# Patient Record
Sex: FEMALE | Race: ASIAN | NOT HISPANIC OR LATINO
[De-identification: names, ages, dates, MRNs, and addresses within clinical notes are randomized per-mention and may not be internally consistent; named-entity substitution may affect disease eponyms.]

---

## 2017-04-10 ENCOUNTER — APPOINTMENT (OUTPATIENT)
Dept: INTERNAL MEDICINE | Facility: CLINIC | Age: 57
End: 2017-04-10

## 2017-04-10 VITALS
BODY MASS INDEX: 22.32 KG/M2 | WEIGHT: 126 LBS | SYSTOLIC BLOOD PRESSURE: 120 MMHG | TEMPERATURE: 97.9 F | DIASTOLIC BLOOD PRESSURE: 70 MMHG | HEIGHT: 63 IN

## 2017-04-10 RX ORDER — NAPROXEN 500 MG/1
500 TABLET ORAL
Qty: 40 | Refills: 0 | Status: ACTIVE | COMMUNITY
Start: 2017-04-10 | End: 1900-01-01

## 2017-05-31 ENCOUNTER — OUTPATIENT (OUTPATIENT)
Dept: OUTPATIENT SERVICES | Facility: HOSPITAL | Age: 57
LOS: 1 days | End: 2017-05-31
Payer: COMMERCIAL

## 2017-05-31 ENCOUNTER — APPOINTMENT (OUTPATIENT)
Dept: MAMMOGRAPHY | Facility: IMAGING CENTER | Age: 57
End: 2017-05-31

## 2017-05-31 DIAGNOSIS — Z00.8 ENCOUNTER FOR OTHER GENERAL EXAMINATION: ICD-10-CM

## 2017-05-31 PROCEDURE — 77067 SCR MAMMO BI INCL CAD: CPT

## 2017-05-31 PROCEDURE — 77063 BREAST TOMOSYNTHESIS BI: CPT

## 2017-06-27 ENCOUNTER — LABORATORY RESULT (OUTPATIENT)
Age: 57
End: 2017-06-27

## 2017-06-27 ENCOUNTER — APPOINTMENT (OUTPATIENT)
Dept: INTERNAL MEDICINE | Facility: CLINIC | Age: 57
End: 2017-06-27

## 2017-06-27 VITALS
BODY MASS INDEX: 22.32 KG/M2 | TEMPERATURE: 98 F | SYSTOLIC BLOOD PRESSURE: 108 MMHG | HEIGHT: 63 IN | WEIGHT: 126 LBS | DIASTOLIC BLOOD PRESSURE: 74 MMHG

## 2017-06-28 LAB
ALBUMIN SERPL ELPH-MCNC: 4.6 G/DL
ALP BLD-CCNC: 55 U/L
ALT SERPL-CCNC: 21 U/L
ANION GAP SERPL CALC-SCNC: 15 MMOL/L
AST SERPL-CCNC: 24 U/L
BASOPHILS # BLD AUTO: 0.01 K/UL
BASOPHILS NFR BLD AUTO: 0.2 %
BILIRUB SERPL-MCNC: 0.6 MG/DL
BUN SERPL-MCNC: 12 MG/DL
CALCIUM SERPL-MCNC: 9.7 MG/DL
CHLORIDE SERPL-SCNC: 104 MMOL/L
CHOLEST SERPL-MCNC: 195 MG/DL
CHOLEST/HDLC SERPL: 4 RATIO
CO2 SERPL-SCNC: 25 MMOL/L
CREAT SERPL-MCNC: 0.72 MG/DL
EOSINOPHIL # BLD AUTO: 0.08 K/UL
EOSINOPHIL NFR BLD AUTO: 1.9 %
GLUCOSE SERPL-MCNC: 92 MG/DL
HCT VFR BLD CALC: 39.8 %
HDLC SERPL-MCNC: 49 MG/DL
HGB BLD-MCNC: 13.3 G/DL
IMM GRANULOCYTES NFR BLD AUTO: 0.2 %
LDLC SERPL CALC-MCNC: 109 MG/DL
LYMPHOCYTES # BLD AUTO: 2.08 K/UL
LYMPHOCYTES NFR BLD AUTO: 48.9 %
MAN DIFF?: NORMAL
MCHC RBC-ENTMCNC: 30.4 PG
MCHC RBC-ENTMCNC: 33.4 GM/DL
MCV RBC AUTO: 91.1 FL
MONOCYTES # BLD AUTO: 0.36 K/UL
MONOCYTES NFR BLD AUTO: 8.5 %
NEUTROPHILS # BLD AUTO: 1.71 K/UL
NEUTROPHILS NFR BLD AUTO: 40.3 %
PLATELET # BLD AUTO: 202 K/UL
POTASSIUM SERPL-SCNC: 3.9 MMOL/L
PROT SERPL-MCNC: 7.4 G/DL
RBC # BLD: 4.37 M/UL
RBC # FLD: 12.4 %
SODIUM SERPL-SCNC: 144 MMOL/L
TRIGL SERPL-MCNC: 187 MG/DL
TSH SERPL-ACNC: 1.75 UIU/ML
WBC # FLD AUTO: 4.25 K/UL

## 2017-07-07 ENCOUNTER — RESULT REVIEW (OUTPATIENT)
Age: 57
End: 2017-07-07

## 2017-07-19 ENCOUNTER — APPOINTMENT (OUTPATIENT)
Dept: INTERNAL MEDICINE | Facility: CLINIC | Age: 57
End: 2017-07-19

## 2017-07-19 ENCOUNTER — LABORATORY RESULT (OUTPATIENT)
Age: 57
End: 2017-07-19

## 2017-07-25 ENCOUNTER — APPOINTMENT (OUTPATIENT)
Dept: ULTRASOUND IMAGING | Facility: CLINIC | Age: 57
End: 2017-07-25

## 2017-07-25 ENCOUNTER — OUTPATIENT (OUTPATIENT)
Dept: OUTPATIENT SERVICES | Facility: HOSPITAL | Age: 57
LOS: 1 days | End: 2017-07-25
Payer: COMMERCIAL

## 2017-07-25 DIAGNOSIS — E04.1 NONTOXIC SINGLE THYROID NODULE: ICD-10-CM

## 2017-07-25 PROCEDURE — 76536 US EXAM OF HEAD AND NECK: CPT

## 2017-08-31 ENCOUNTER — APPOINTMENT (OUTPATIENT)
Dept: INTERNAL MEDICINE | Facility: CLINIC | Age: 57
End: 2017-08-31
Payer: COMMERCIAL

## 2017-08-31 VITALS
WEIGHT: 127 LBS | SYSTOLIC BLOOD PRESSURE: 114 MMHG | BODY MASS INDEX: 22.5 KG/M2 | HEIGHT: 63 IN | DIASTOLIC BLOOD PRESSURE: 74 MMHG

## 2017-08-31 DIAGNOSIS — Z00.00 ENCOUNTER FOR GENERAL ADULT MEDICAL EXAMINATION W/OUT ABNORMAL FINDINGS: ICD-10-CM

## 2017-08-31 DIAGNOSIS — E78.5 HYPERLIPIDEMIA, UNSPECIFIED: ICD-10-CM

## 2017-08-31 PROCEDURE — 99396 PREV VISIT EST AGE 40-64: CPT | Mod: 25

## 2017-08-31 PROCEDURE — G0008: CPT

## 2017-08-31 PROCEDURE — 93000 ELECTROCARDIOGRAM COMPLETE: CPT

## 2017-08-31 PROCEDURE — 94010 BREATHING CAPACITY TEST: CPT

## 2017-08-31 PROCEDURE — 90686 IIV4 VACC NO PRSV 0.5 ML IM: CPT

## 2018-04-26 ENCOUNTER — OUTPATIENT (OUTPATIENT)
Dept: OUTPATIENT SERVICES | Facility: HOSPITAL | Age: 58
LOS: 1 days | End: 2018-04-26
Payer: COMMERCIAL

## 2018-04-26 ENCOUNTER — APPOINTMENT (OUTPATIENT)
Dept: RADIOLOGY | Facility: IMAGING CENTER | Age: 58
End: 2018-04-26
Payer: COMMERCIAL

## 2018-04-26 DIAGNOSIS — Z00.00 ENCOUNTER FOR GENERAL ADULT MEDICAL EXAMINATION WITHOUT ABNORMAL FINDINGS: ICD-10-CM

## 2018-04-26 PROCEDURE — 77080 DXA BONE DENSITY AXIAL: CPT

## 2018-04-26 PROCEDURE — 77080 DXA BONE DENSITY AXIAL: CPT | Mod: 26

## 2019-06-22 ENCOUNTER — EMERGENCY (EMERGENCY)
Facility: HOSPITAL | Age: 59
LOS: 1 days | Discharge: HOME | End: 2019-06-22
Admitting: EMERGENCY MEDICINE
Payer: COMMERCIAL

## 2019-06-22 VITALS
TEMPERATURE: 98 F | OXYGEN SATURATION: 99 % | RESPIRATION RATE: 16 BRPM | HEART RATE: 64 BPM | DIASTOLIC BLOOD PRESSURE: 82 MMHG | SYSTOLIC BLOOD PRESSURE: 137 MMHG

## 2019-06-22 PROCEDURE — 99283 EMERGENCY DEPT VISIT LOW MDM: CPT

## 2019-06-22 RX ORDER — CYCLOBENZAPRINE HYDROCHLORIDE 10 MG/1
5 TABLET, FILM COATED ORAL ONCE
Refills: 0 | Status: COMPLETED | OUTPATIENT
Start: 2019-06-22 | End: 2019-06-22

## 2019-06-22 RX ORDER — IBUPROFEN 200 MG
1 TABLET ORAL
Qty: 12 | Refills: 0
Start: 2019-06-22 | End: 2019-06-25

## 2019-06-22 RX ORDER — CYCLOBENZAPRINE HYDROCHLORIDE 10 MG/1
1 TABLET, FILM COATED ORAL
Qty: 9 | Refills: 0
Start: 2019-06-22 | End: 2019-06-24

## 2019-06-22 RX ORDER — ACETAMINOPHEN 500 MG
650 TABLET ORAL ONCE
Refills: 0 | Status: COMPLETED | OUTPATIENT
Start: 2019-06-22 | End: 2019-06-22

## 2019-06-22 RX ORDER — IBUPROFEN 200 MG
1 TABLET ORAL
Qty: 9 | Refills: 0
Start: 2019-06-22 | End: 2019-06-24

## 2019-06-22 RX ORDER — LIDOCAINE 4 G/100G
1 CREAM TOPICAL ONCE
Refills: 0 | Status: COMPLETED | OUTPATIENT
Start: 2019-06-22 | End: 2019-06-22

## 2019-06-22 RX ORDER — IBUPROFEN 200 MG
600 TABLET ORAL ONCE
Refills: 0 | Status: COMPLETED | OUTPATIENT
Start: 2019-06-22 | End: 2019-06-22

## 2019-06-22 RX ADMIN — CYCLOBENZAPRINE HYDROCHLORIDE 5 MILLIGRAM(S): 10 TABLET, FILM COATED ORAL at 12:59

## 2019-06-22 RX ADMIN — LIDOCAINE 1 PATCH: 4 CREAM TOPICAL at 12:59

## 2019-06-22 RX ADMIN — Medication 650 MILLIGRAM(S): at 12:58

## 2019-06-22 RX ADMIN — Medication 600 MILLIGRAM(S): at 12:59
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
## 2019-06-22 NOTE — ED PROVIDER NOTE - NSFOLLOWUPINSTRUCTIONS_ED_ALL_ED_FT
Follow with your PMD within 48-72 hours.  Rest, no heavy lifting.  Warm compresses to area. Recommend Ortho consult to discuss possible MRI vs Physical Therapy- referral list provided.  Light walking. Take Motrin 600 mg every 6-8 hours for pain with food, Flexeril 5mg every 8 hours as needed for muscle spasm- caution drowsiness/do not drive. Any worsening pain, weakness, numbness, bowel or urinary incontinence or new concerning symptoms return to the Emergency Department.
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
## 2019-06-22 NOTE — ED PROVIDER NOTE - PROGRESS NOTE DETAILS
STEFANIE Munoz- pt feeling much better with lower back and trapezius pain. headache resolved. amenable for dc home, pt will not be driving. given ortho f/u

## 2019-06-22 NOTE — ED PROVIDER NOTE - ENMT, MLM
Airway patent, Nasal mucosa clear. Mouth with normal mucosa. Throat has no vesicles, no oropharyngeal exudates and uvula is midline. Head atraumatic. Holocephalic.

## 2019-06-22 NOTE — ED PROVIDER NOTE - RESPIRATORY, MLM
Breath sounds clear and equal bilaterally. Breath sounds clear and equal bilaterally. Atraumatic. No seatbelt sign.
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
## 2019-06-22 NOTE — ED PROVIDER NOTE - GASTROINTESTINAL, MLM
Abdomen soft, non-tender, no guarding. Abdomen soft, non-tender, no guarding. Atraumatic. No seatbelt sign.

## 2019-06-22 NOTE — ED PROVIDER NOTE - MUSCULOSKELETAL MINIMAL EXAM
atraumatic/normal range of motion/MUSCLE SPASMS/+lumbar paraspinal muscle spasm with TTP. +trapezius muscle spasm b/l

## 2021-07-27 NOTE — ED PROVIDER NOTE - ATTESTATION, MLM
Per mom pt seen last night for diarrhea and abdominal pain and decreased po intake. Given fluids and sent home. Today pt still with diarrhea and decreased po intake.
I have reviewed and confirmed nurses' notes for patient's medications, allergies, medical history, and surgical history.

## 2022-11-08 NOTE — ED PROVIDER NOTE - CHIEF COMPLAINT
The patient is a 58y Female complaining of HA, back pain, neck pain Discussed with Patient Haldol benefits, risks, s-e Discussed with Patient Haldol benefits, risks, s-e Discussed with Patient Haldol benefits, risks, s-e Discussed with Patient Haldol benefits, risks, s-e Discussed with Patient Haldol benefits, risks, s-e Discussed with Patient Haldol benefits, risks, s-e Discussed with Patient Haldol benefits, risks, s-e

## 2023-07-21 ENCOUNTER — APPOINTMENT (OUTPATIENT)
Dept: ORTHOPEDIC SURGERY | Facility: CLINIC | Age: 63
End: 2023-07-21
Payer: COMMERCIAL

## 2023-07-21 VITALS — HEIGHT: 63 IN | WEIGHT: 125 LBS | BODY MASS INDEX: 22.15 KG/M2

## 2023-07-21 DIAGNOSIS — S90.31XA CONTUSION OF RIGHT FOOT, INITIAL ENCOUNTER: ICD-10-CM

## 2023-07-21 DIAGNOSIS — E78.00 PURE HYPERCHOLESTEROLEMIA, UNSPECIFIED: ICD-10-CM

## 2023-07-21 PROCEDURE — 73660 X-RAY EXAM OF TOE(S): CPT | Mod: RT

## 2023-07-21 PROCEDURE — 99203 OFFICE O/P NEW LOW 30 MIN: CPT

## 2023-07-22 PROBLEM — S90.31XA CONTUSION OF RIGHT FOOT, INITIAL ENCOUNTER: Status: ACTIVE | Noted: 2023-07-22

## 2023-07-22 NOTE — IMAGING
[Right] : right toe [There are no fractures, subluxations or dislocations. No significant abnormalities are seen] : There are no fractures, subluxations or dislocations. No significant abnormalities are seen [de-identified] : Right ankle/foot with no skin change Or bony deformity. Right ankle with full and pain-free range of motion there is no ligamentous laxity noted. There is no pain with hind foot or 4 foot compression all digits Are Neurovascularly Intact. There is tenderness to palpation over the great toe volar P1 Region only. Flexion and extension strength of this digit is 5/5, despite discomfort.

## 2023-07-22 NOTE — ASSESSMENT
[FreeTextEntry1] : The patient was advised of the diagnosis. The natural history of the pathology was explained in full to the patient in layman's terms. All questions were answered. The risks and benefits of surgical and non-surgical treatment alternatives were explained in full to the patient.\par Pt will maintain 1st/2nd digit papito tape for comfort x 2-3 weeks (until pain resolves).\par Pt will rto prn, as she is leaving the country for vacation.
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
## 2024-10-10 ENCOUNTER — EMERGENCY (EMERGENCY)
Facility: HOSPITAL | Age: 64
LOS: 1 days | Discharge: HOME | End: 2024-10-10
Attending: PERSONAL EMERGENCY RESPONSE ATTENDANT
Payer: COMMERCIAL

## 2024-10-10 VITALS
SYSTOLIC BLOOD PRESSURE: 171 MMHG | TEMPERATURE: 97 F | RESPIRATION RATE: 18 BRPM | DIASTOLIC BLOOD PRESSURE: 96 MMHG | HEART RATE: 62 BPM

## 2024-10-10 LAB
ALBUMIN SERPL ELPH-MCNC: 4.6 G/DL — SIGNIFICANT CHANGE UP (ref 3.3–5)
ALP SERPL-CCNC: 81 U/L — SIGNIFICANT CHANGE UP (ref 40–120)
ALT FLD-CCNC: 17 U/L — SIGNIFICANT CHANGE UP (ref 10–45)
ANION GAP SERPL CALC-SCNC: 11 MMOL/L — SIGNIFICANT CHANGE UP (ref 5–17)
APTT BLD: 31.9 SEC — SIGNIFICANT CHANGE UP (ref 24.5–35.6)
AST SERPL-CCNC: 17 U/L — SIGNIFICANT CHANGE UP (ref 10–40)
BASOPHILS # BLD AUTO: 0.02 K/UL — SIGNIFICANT CHANGE UP (ref 0–0.2)
BASOPHILS NFR BLD AUTO: 0.4 % — SIGNIFICANT CHANGE UP (ref 0–2)
BILIRUB SERPL-MCNC: 0.6 MG/DL — SIGNIFICANT CHANGE UP (ref 0.2–1.2)
BUN SERPL-MCNC: 20 MG/DL — SIGNIFICANT CHANGE UP (ref 7–23)
CALCIUM SERPL-MCNC: 9.4 MG/DL — SIGNIFICANT CHANGE UP (ref 8.4–10.5)
CHLORIDE SERPL-SCNC: 104 MMOL/L — SIGNIFICANT CHANGE UP (ref 96–108)
CO2 SERPL-SCNC: 28 MMOL/L — SIGNIFICANT CHANGE UP (ref 22–31)
CREAT SERPL-MCNC: 0.61 MG/DL — SIGNIFICANT CHANGE UP (ref 0.5–1.3)
EGFR: 101 ML/MIN/1.73M2 — SIGNIFICANT CHANGE UP
EOSINOPHIL # BLD AUTO: 0.08 K/UL — SIGNIFICANT CHANGE UP (ref 0–0.5)
EOSINOPHIL NFR BLD AUTO: 1.6 % — SIGNIFICANT CHANGE UP (ref 0–6)
GLUCOSE SERPL-MCNC: 110 MG/DL — HIGH (ref 70–99)
HCT VFR BLD CALC: 41.5 % — SIGNIFICANT CHANGE UP (ref 34.5–45)
HGB BLD-MCNC: 13.7 G/DL — SIGNIFICANT CHANGE UP (ref 11.5–15.5)
IMM GRANULOCYTES NFR BLD AUTO: 0.2 % — SIGNIFICANT CHANGE UP (ref 0–0.9)
INR BLD: 0.88 RATIO — SIGNIFICANT CHANGE UP (ref 0.85–1.16)
LYMPHOCYTES # BLD AUTO: 2.52 K/UL — SIGNIFICANT CHANGE UP (ref 1–3.3)
LYMPHOCYTES # BLD AUTO: 51.9 % — HIGH (ref 13–44)
MCHC RBC-ENTMCNC: 30.6 PG — SIGNIFICANT CHANGE UP (ref 27–34)
MCHC RBC-ENTMCNC: 33 GM/DL — SIGNIFICANT CHANGE UP (ref 32–36)
MCV RBC AUTO: 92.6 FL — SIGNIFICANT CHANGE UP (ref 80–100)
MONOCYTES # BLD AUTO: 0.5 K/UL — SIGNIFICANT CHANGE UP (ref 0–0.9)
MONOCYTES NFR BLD AUTO: 10.3 % — SIGNIFICANT CHANGE UP (ref 2–14)
NEUTROPHILS # BLD AUTO: 1.73 K/UL — LOW (ref 1.8–7.4)
NEUTROPHILS NFR BLD AUTO: 35.6 % — LOW (ref 43–77)
NRBC # BLD: 0 /100 WBCS — SIGNIFICANT CHANGE UP (ref 0–0)
PLATELET # BLD AUTO: 174 K/UL — SIGNIFICANT CHANGE UP (ref 150–400)
POTASSIUM SERPL-MCNC: 3.8 MMOL/L — SIGNIFICANT CHANGE UP (ref 3.5–5.3)
POTASSIUM SERPL-SCNC: 3.8 MMOL/L — SIGNIFICANT CHANGE UP (ref 3.5–5.3)
PROT SERPL-MCNC: 7 G/DL — SIGNIFICANT CHANGE UP (ref 6–8.3)
PROTHROM AB SERPL-ACNC: 10.1 SEC — SIGNIFICANT CHANGE UP (ref 9.9–13.4)
RBC # BLD: 4.48 M/UL — SIGNIFICANT CHANGE UP (ref 3.8–5.2)
RBC # FLD: 12.3 % — SIGNIFICANT CHANGE UP (ref 10.3–14.5)
SODIUM SERPL-SCNC: 143 MMOL/L — SIGNIFICANT CHANGE UP (ref 135–145)
TROPONIN T, HIGH SENSITIVITY RESULT: <6 NG/L — SIGNIFICANT CHANGE UP (ref 0–51)
WBC # BLD: 4.86 K/UL — SIGNIFICANT CHANGE UP (ref 3.8–10.5)
WBC # FLD AUTO: 4.86 K/UL — SIGNIFICANT CHANGE UP (ref 3.8–10.5)

## 2024-10-10 PROCEDURE — 70450 CT HEAD/BRAIN W/O DYE: CPT | Mod: 26,59,MC

## 2024-10-10 PROCEDURE — 70498 CT ANGIOGRAPHY NECK: CPT | Mod: 26,MC

## 2024-10-10 PROCEDURE — 99291 CRITICAL CARE FIRST HOUR: CPT

## 2024-10-10 PROCEDURE — 70496 CT ANGIOGRAPHY HEAD: CPT | Mod: 26,MC

## 2024-10-10 RX ORDER — METOCLOPRAMIDE HCL 5 MG
10 TABLET ORAL ONCE
Refills: 0 | Status: COMPLETED | OUTPATIENT
Start: 2024-10-10 | End: 2024-10-10

## 2024-10-10 RX ORDER — ACETAMINOPHEN 325 MG
1000 TABLET ORAL ONCE
Refills: 0 | Status: COMPLETED | OUTPATIENT
Start: 2024-10-10 | End: 2024-10-10

## 2024-10-10 RX ORDER — SODIUM CHLORIDE 0.9 % (FLUSH) 0.9 %
1000 SYRINGE (ML) INJECTION ONCE
Refills: 0 | Status: COMPLETED | OUTPATIENT
Start: 2024-10-10 | End: 2024-10-10

## 2024-10-10 RX ADMIN — Medication 10 MILLIGRAM(S): at 23:29

## 2024-10-10 RX ADMIN — Medication 400 MILLIGRAM(S): at 23:31

## 2024-10-10 RX ADMIN — Medication 1000 MILLILITER(S): at 23:29

## 2024-10-10 NOTE — ED PROVIDER NOTE - ATTENDING CONTRIBUTION TO CARE
Attending MD Riggs:  I performed a history and physical exam of the patient and discussed their management with the resident. I reviewed the resident's note and agree with the documented findings and plan of care. My medical decision making and observations are found above.    Critical care billing:  Upon my evaluation, this patient had a high probability of imminent or life-threatening deterioration due to stroke-code, which required my direct attention, intervention, and personal management.  The patient has a  medical condition that impairs one or more vital organ systems.  Frequent personal assessment and adjustment of medical interventions was performed.      I have personally provided 30 minutes of critical care time exclusive of time spent on separately billable procedures. Time includes review of laboratory data, radiology results, discussion with consultants, patient and family; monitoring for potential decompensation, as well as time spent retrieving data and reviewing the chart and documenting the visit. Interventions were performed as documented above.

## 2024-10-10 NOTE — ED ADULT NURSE NOTE - OBJECTIVE STATEMENT
Strong peripheral pulses Pt is a 63 y/o female with PMH HTN and headaches presenting to the ED c/o R eye "spots" in vision. Syriac  [id number]. Code stroke activated 2213. Pt states onset of spots 2 hours prior to arrival. Pt also reports having sharp R sided HA intermittent x2 days, states HA not currently present. Pt denies associated weakness, double/blurry vision, changes in speech, numbness/tingling, dizziness, chest pain, sob, unsteady gait.

## 2024-10-10 NOTE — ED PROVIDER NOTE - PHYSICAL EXAMINATION
GENERAL: well appearing in no acute distress, non-toxic appearing  HEENT: pupils 3 + equal and reactive, EOM intact, visual fields intact  CARDIAC: regular rate and rhythm, normal S1S2, no appreciable murmurs, 2+ pulses in UE/LE b/l  PULM: normal breath sounds, clear to ascultation bilaterally, no rales, rhonchi, wheezing  GI: abdomen nondistended, soft, nontender, no guarding, rebound tenderness  NEURO: Moves all extremities spontaneously. symmetric  strength b/l UE, elbow flexion 5/5 bilaterally, elbow extension 5/5 bilaterally, patient can straight leg raise bilaterally and resist symmetrically, symmetric smile, EOM intact b/l

## 2024-10-10 NOTE — ED PROVIDER NOTE - NS ED ROS FT
General: denies fever, chills  Eyes: R eye floaters, no vision loss, no blurry vision  CV: denies chest pain, palpitations  Abdominal: denies nausea, vomiting, diarrhea, abdominal pain  MSK: denies muscle aches, leg swelling  Neuro: +headaches, denies weakness, numbness, tingling

## 2024-10-10 NOTE — ED PROVIDER NOTE - NSFOLLOWUPINSTRUCTIONS_ED_ALL_ED_FT
Discharge Instructions for Posterior Vitreous Detachment Understanding Your Condition:  You have been diagnosed with posterior vitreous detachment, which is a common condition where the vitreous gel pulls away from the retina. This may cause floaters, flashes of light, or other visual disturbances. Monitor Symptoms:  Be aware of any new or worsening symptoms, such as: Sudden increase in floaters Flashes of light that are persistent or worsening A shadow or curtain effect in your vision Sudden loss of vision Activity Restrictions:  Avoid strenuous activities, heavy lifting, or vigorous exercise for at least a week, or as advised by your doctor. Rest your eyes and avoid any activities that may strain your vision. Follow-Up Care:  Schedule a follow-up appointment with an ophthalmologist within the next few days for a thorough examination. Bring this discharge information to your appointment. Medication:  If prescribed, take any medications as directed. Use over-the-counter pain relief if needed, but avoid blood thinners unless instructed by your doctor. Eye Care:  Avoid rubbing or pressing on your eyes. Use sunglasses if bright lights cause discomfort. When to Seek Immediate Medical Attention:  If you experience any of the following, seek immediate care: Sudden loss of vision Significant changes in vision Severe headache Persistent or worsening flashes or floaters       follow-up with his/her ophthalmologist or with [facility] Department of Ophthalmology within 1 week of after discharge at:    [address]  [place], [place] [id number] [phone]

## 2024-10-10 NOTE — ED ADULT NURSE NOTE - NSFALLUNIVINTERV_ED_ALL_ED
Bed/Stretcher in lowest position, wheels locked, appropriate side rails in place/Call bell, personal items and telephone in reach/Instruct patient to call for assistance before getting out of bed/chair/stretcher/Non-slip footwear applied when patient is off stretcher/[place] to call system/Physically safe environment - no spills, clutter or unnecessary equipment/Purposeful proactive rounding/Room/bathroom lighting operational, light cord in reach

## 2024-10-10 NOTE — CONSULT NOTE ADULT - ASSESSMENT
Patient ADARSH TAVAREZ is a 64y (1960) woman with a PMHx significant for Hyperlipidemia reported to hospital secondary to right eye spots in her peripheral vision onset at 10/10/ 2030. Neurology consulted for this. Patient states she was watching television when she noticed that in her right eye in the periphery she had a floating black spot although it on her right gaze, no issues in her left eye. Prior infection of right eye. History of headache but no headache at this time, no history of migraines. Neuro exam non-focal.     Impression: Unilateral right eye vision abnormalities (one spot), no neurological symptoms at this time, in setting of recent right eye infection  Etiology: Likely opthalmologic in nature, less likely neurological (ie migraine)    Recommendations  - Optho consult  - No Further neurological inpatient at this time    Case discussed with stroke fellow, Dontrell Spears, under supervision of attending, Johny Menendez.

## 2024-10-10 NOTE — ED PROVIDER NOTE - PROGRESS NOTE DETAILS
Ophthalmology requesting holding patient till the morning for ophthalmology attending to reevaluate patient agreed with plan, patient to be placed in CDU for further monitoring reassessment and ophthalmology disposition

## 2024-10-10 NOTE — CONSULT NOTE ADULT - SUBJECTIVE AND OBJECTIVE BOX
Neurology - Consult Note    -  Spectra: [phone] ([facility]), [phone] ([facility])  -    HPI: Patient ADARSH TAVAREZ is a 64y (1960) woman with a PMHx significant for Hyperlipidemia reported to hospital secondary to right eye spots in her peripheral vision onset at 10/10/ 2030. Neurology consulted for this. Patient states she was watching television when she noticed that in her right eye in the periphery she had a floating black spot although it on her right gaze, no issues in her left eye.  Patient denies any other neurological symptoms such as focal numbness or weakness, difficulty speaking or difficulty walking.   Patient states that she recently did see her ophthalmologist approximately 1-1/2 months ago for an infection of the retina and was given antibiotics for this.  Patient states she does not have a headache at this time or prior to the symptoms, last time she had this headache was early in the morning and resolved after an hour.  Patient does report a history of right-sided headache onset 5 to 10 days ago that is intermittent and gradually increasing, the headache last for 3 hours located in the back of the head which feels like a picking with a needle, 6/10.  Patient denies any photophobia or phonophobia with this.  Patient denies any recent neck trauma. Patient denies any history of head trauma, CNS infections.  Patient denies any recent fever, chills, nausea, vomiting, chest pain, shortness of breath or urological complaints. Patient reports chronic neck pain but no worsening of neck pain.     On arrival to ED, /93, code stroke was called for visual symptoms, NIHSS 0.        Review of Systems:     CONSTITUTIONAL: No fevers or chills  EYES AND ENT: +visual changes  NECK: + pain (chronic)  RESPIRATORY: No shortness of breath  CARDIOVASCULAR: No chest pain or palpitations  NEUROLOGICAL: +As stated in HPI above    Allergies:  No Known Allergies      PMHx/PSHx/Family Hx: As above, otherwise see below   No Past Medical History    No pertinent past medical history        Social Hx:  No current use of tobacco, alcohol, or illicit drugs    Medications:  MEDICATIONS  (STANDING):    MEDICATIONS  (PRN):      Vitals:  T(C): 36.3 (10-10-24 @ 22:55), Max: 36.3 (10-10-24 @ 22:55)  HR: 59 (10-10-24 @ 22:55) (59 - 62)  BP: 160/93 (10-10-24 @ 22:55) (160/93 - 171/96)  RR: 16 (10-10-24 @ 22:55) (16 - 18)  SpO2: 98% (10-10-24 @ 22:55) (98% - 98%)    Physical Examination:  General - NAD  edema    Neurologic Exam:  Mental status - Awake, Alert, Oriented to person, place, and time. Speech fluent, repetition and naming intact. Follows simple commands.    Cranial nerves - PERRLA, VFF, EOMI, face sensation (V1-V3) intact b/l, facial strength intact without asymmetry b/l, palate with symmetric elevation, trapezius/sternocleidomastoid 5/5 strength b/l, tongue midline on protrusion with full lateral movement    Motor - Normal bulk and tone throughout. No pronator drift.  Strength testing            Deltoid      Biceps      Triceps      Wrist Extension     Wrist Flexion     Interossei         R            5                 5               5                     5                              5                        5                 5  L             5                 5               5                     5                              5                        5                 5              Hip Flexion    Hip Extension    Knee Flexion    Knee Extension    Dorsiflexion    Plantar Flexion  R              5                           5                       5                         5                            5                          5  L              5                           5                        5                         5                            5                          5    Sensation - Light touch intact throughout    DTR's -             Biceps      Triceps     Brachioradialis                Patellar    Ankle    Toes/plantar response  R             2+             2+                  2+                       2+            2+                 Down  L              2+             2+                 2+                        2+           2+                 Down    Coordination - Finger to Nose intact b/l. HTS intact b/l No tremors appreciated    Gait and station - Normal casual gait. Romberg (-)    Labs:                        13.7   4.86  )-----------( 174      ( 10 Oct 2024 22:21 )             41.5     10-10    143  |  104  |  20  ----------------------------<  110[H]  3.8   |  28  |  0.61    Ca    9.4      10 Oct 2024 22:21    TPro  7.0  /  Alb  4.6  /  TBili  0.6  /  DBili  x   /  AST  17  /  ALT  17  /  AlkPhos  81  10-10    CAPILLARY BLOOD GLUCOSE      POCT Blood Glucose.: 121 mg/dL (10 Oct 2024 22:12)    LIVER FUNCTIONS - ( 10 Oct 2024 22:21 )  Alb: 4.6 g/dL / Pro: 7.0 g/dL / ALK PHOS: 81 U/L / ALT: 17 U/L / AST: 17 U/L / GGT: x             PT/INR - ( 10 Oct 2024 22:21 )   PT: 10.1 sec;   INR: 0.88 ratio         PTT - ( 10 Oct 2024 22:21 )  PTT:31.9 sec  CSF:                  Radiology:  CTH 10/10  IMPRESSION:  No acute intracranial hemorrhage, mass effect, or CT evidence of an acute vascular territorial infarct.      IMPRESSION:  CTA NECK 10/10  No evidence of significant stenosis or occlusion.    CTA HEAD 10/10  Patent intracranial circulation without flow limiting stenosis.  No evidence of aneurysm. Tiny aneurysms can be beyond the resolution of CTA technique.     Neurology - Consult Note    -  Spectra: [phone] ([facility]), [phone] ([facility])  -    HPI: Patient ADARSH TAVAREZ is a 64y (1960) woman with a PMHx significant for Hyperlipidemia reported to hospital secondary to right eye spots in her peripheral vision onset at 10/10/ 2030. Neurology consulted for this. Patient states she was watching television when she noticed that in her right eye in the periphery she had a floating black spot although it on her right gaze, no issues in her left eye.  Patient denies any other neurological symptoms such as focal numbness or weakness, difficulty speaking or difficulty walking.   Patient states that she recently did see her ophthalmologist approximately 1-1/2 months ago for an infection of the retina and was given antibiotics for this.  Patient states she does not have a headache at this time or prior to the symptoms, last time she had this headache was early in the morning and resolved after an hour.  Patient does report a history of right-sided headache onset 5 to 10 days ago that is intermittent and gradually increasing, the headache last for 3 hours located in the back of the head which feels like a picking with a needle, 6/10.  Patient denies any photophobia or phonophobia with this.  Patient denies any recent neck trauma. Patient denies any history of head trauma, CNS infections.  Patient denies any recent fever, chills, nausea, vomiting, chest pain, shortness of breath or urological complaints. Patient reports chronic neck pain but no worsening of neck pain.     On arrival to ED, /93, code stroke was called for visual symptoms, NIHSS 0.        Review of Systems:     CONSTITUTIONAL: No fevers or chills  EYES AND ENT: +visual changes  NECK: + pain (chronic)  RESPIRATORY: No shortness of breath  CARDIOVASCULAR: No chest pain or palpitations  NEUROLOGICAL: +As stated in HPI above    Allergies:  No Known Allergies      PMHx/PSHx/Family Hx: As above, otherwise see below   No Past Medical History    No pertinent past medical history        Social Hx:  No current use of tobacco, alcohol, or illicit drugs    Medications:  MEDICATIONS  (STANDING):    MEDICATIONS  (PRN):      Vitals:  T(C): 36.3 (10-10-24 @ 22:55), Max: 36.3 (10-10-24 @ 22:55)  HR: 59 (10-10-24 @ 22:55) (59 - 62)  BP: 160/93 (10-10-24 @ 22:55) (160/93 - 171/96)  RR: 16 (10-10-24 @ 22:55) (16 - 18)  SpO2: 98% (10-10-24 @ 22:55) (98% - 98%)    Physical Examination:  General - NAD  edema    Neurologic Exam:  Mental status - Awake, Alert, Oriented to person, place, and time. Speech fluent, repetition and naming intact. Follows simple commands.    Cranial nerves - PERRLA, VFF, Visual field 20/30 b/l, EOMI, face sensation (V1-V3) intact b/l, facial strength intact without asymmetry b/l, palate with symmetric elevation, trapezius/sternocleidomastoid 5/5 strength b/l, tongue midline on protrusion with full lateral movement    Motor - Normal bulk and tone throughout. No pronator drift.  Strength testing            Deltoid      Biceps      Triceps      Wrist Extension     Wrist Flexion     Interossei         R            5                 5               5                     5                              5                        5                 5  L             5                 5               5                     5                              5                        5                 5              Hip Flexion    Hip Extension    Knee Flexion    Knee Extension    Dorsiflexion    Plantar Flexion  R              5                           5                       5                         5                            5                          5  L              5                           5                        5                         5                            5                          5    Sensation - Light touch intact throughout    DTR's -             Biceps      Triceps     Brachioradialis                Patellar    Ankle    Toes/plantar response  R             2+             2+                  2+                       2+            2+                 Down  L              2+             2+                 2+                        2+           2+                 Down    Coordination - Finger to Nose intact b/l. HTS intact b/l No tremors appreciated    Gait and station - Normal casual gait. Romberg (-)    Labs:                        13.7   4.86  )-----------( 174      ( 10 Oct 2024 22:21 )             41.5     10-10    143  |  104  |  20  ----------------------------<  110[H]  3.8   |  28  |  0.61    Ca    9.4      10 Oct 2024 22:21    TPro  7.0  /  Alb  4.6  /  TBili  0.6  /  DBili  x   /  AST  17  /  ALT  17  /  AlkPhos  81  10-10    CAPILLARY BLOOD GLUCOSE      POCT Blood Glucose.: 121 mg/dL (10 Oct 2024 22:12)    LIVER FUNCTIONS - ( 10 Oct 2024 22:21 )  Alb: 4.6 g/dL / Pro: 7.0 g/dL / ALK PHOS: 81 U/L / ALT: 17 U/L / AST: 17 U/L / GGT: x             PT/INR - ( 10 Oct 2024 22:21 )   PT: 10.1 sec;   INR: 0.88 ratio         PTT - ( 10 Oct 2024 22:21 )  PTT:31.9 sec  CSF:                  Radiology:  CTH 10/10  IMPRESSION:  No acute intracranial hemorrhage, mass effect, or CT evidence of an acute vascular territorial infarct.      IMPRESSION:  CTA NECK 10/10  No evidence of significant stenosis or occlusion.    CTA HEAD 10/10  Patent intracranial circulation without flow limiting stenosis.  No evidence of aneurysm. Tiny aneurysms can be beyond the resolution of CTA technique.

## 2024-10-10 NOTE — ED PROVIDER NOTE - CLINICAL SUMMARY MEDICAL DECISION MAKING FREE TEXT BOX
64 hx HTN, frequent unilateral headaches, p/w R sided black spots in peripheral visual field started 8pm, prior to that had unilateral stabbing headache, did not take any meds. denies weakness, numbness, tingling. , vitals WNL. on arrival  b/l 5/5 UE and LE strength, no dysmetria, EOM intact, visual fields intact, no vision loss, likely retinal detachment versus complex migraine, do not think this is LVO. pending CT/CTA, will treat as a migraine. 64 hx HTN, frequent unilateral headaches, p/w R sided black spots in peripheral visual field started 8pm, prior to that had unilateral stabbing headache, did not take any meds. denies weakness, numbness, tingling. , vitals WNL. on arrival  b/l 5/5 UE and LE strength, no dysmetria, EOM intact, visual fields intact, no vision loss, likely retinal detachment versus complex migraine, do not think this is LVO. pending CT/CTA, will treat as a migraine.    Attending MD Riggs.  Agree with above.  Pt is a 63 yo fem with pmhx of HTN, frequent unilateral HA's who presents to ED with R eye peripheral visual field spots starting 2000.  Pt endorses unilateral stabbing HA preceding event.  Denies taking meds PTA.  Denies weakness/numbness/tingling elsewhere.   on arrival. Vital signs non-actionable.  Pt well appearing.  Rest of neuro exam non-actionable. Stroke code called on arrival and continued in light of concern for potential ophthalmic stroke.  Vision 20/30 bilaterally with preserved report of spots.

## 2024-10-10 NOTE — ED PROVIDER NOTE - PATIENT PORTAL LINK FT
You can access the FollowMyHealth Patient Portal offered by [facility] by registering at the following website: [email or web address]. By joining [employer]’s FollowMyHealth portal, you will also be able to view your health information using other applications (apps) compatible with our system.

## 2024-10-10 NOTE — ED ADULT NURSE NOTE - ED STAT RN HANDOFF DETAILS
Report received from ELENA Key patient resting comfortably awaiting Optho no signs of distress noted.

## 2024-10-11 VITALS
RESPIRATION RATE: 18 BRPM | HEART RATE: 54 BPM | DIASTOLIC BLOOD PRESSURE: 81 MMHG | SYSTOLIC BLOOD PRESSURE: 150 MMHG | OXYGEN SATURATION: 99 % | TEMPERATURE: 98 F

## 2024-10-11 LAB — ADD ON TEST-SPECIMEN IN LAB: SIGNIFICANT CHANGE UP

## 2024-10-11 PROCEDURE — 82962 GLUCOSE BLOOD TEST: CPT

## 2024-10-11 PROCEDURE — 80053 COMPREHEN METABOLIC PANEL: CPT

## 2024-10-11 PROCEDURE — 93005 ELECTROCARDIOGRAM TRACING: CPT

## 2024-10-11 PROCEDURE — 96374 THER/PROPH/DIAG INJ IV PUSH: CPT | Mod: XU

## 2024-10-11 PROCEDURE — 85025 COMPLETE CBC W/AUTO DIFF WBC: CPT

## 2024-10-11 PROCEDURE — 85610 PROTHROMBIN TIME: CPT

## 2024-10-11 PROCEDURE — 99291 CRITICAL CARE FIRST HOUR: CPT | Mod: 25

## 2024-10-11 PROCEDURE — 85652 RBC SED RATE AUTOMATED: CPT

## 2024-10-11 PROCEDURE — 70450 CT HEAD/BRAIN W/O DYE: CPT | Mod: MC

## 2024-10-11 PROCEDURE — 70496 CT ANGIOGRAPHY HEAD: CPT | Mod: MC

## 2024-10-11 PROCEDURE — G0378: CPT

## 2024-10-11 PROCEDURE — 70498 CT ANGIOGRAPHY NECK: CPT | Mod: MC

## 2024-10-11 PROCEDURE — 84295 ASSAY OF SERUM SODIUM: CPT

## 2024-10-11 PROCEDURE — 99222 1ST HOSP IP/OBS MODERATE 55: CPT

## 2024-10-11 PROCEDURE — 85730 THROMBOPLASTIN TIME PARTIAL: CPT

## 2024-10-11 PROCEDURE — 85018 HEMOGLOBIN: CPT

## 2024-10-11 PROCEDURE — 85014 HEMATOCRIT: CPT

## 2024-10-11 PROCEDURE — 83605 ASSAY OF LACTIC ACID: CPT

## 2024-10-11 PROCEDURE — 82435 ASSAY OF BLOOD CHLORIDE: CPT

## 2024-10-11 PROCEDURE — 84484 ASSAY OF TROPONIN QUANT: CPT

## 2024-10-11 PROCEDURE — 82330 ASSAY OF CALCIUM: CPT

## 2024-10-11 PROCEDURE — 82947 ASSAY GLUCOSE BLOOD QUANT: CPT

## 2024-10-11 PROCEDURE — 84132 ASSAY OF SERUM POTASSIUM: CPT

## 2024-10-11 PROCEDURE — 82803 BLOOD GASES ANY COMBINATION: CPT

## 2024-10-11 RX ORDER — ATORVASTATIN CALCIUM 10 MG/1
20 TABLET, FILM COATED ORAL ONCE
Refills: 0 | Status: COMPLETED | OUTPATIENT
Start: 2024-10-11 | End: 2024-10-11

## 2024-10-11 RX ADMIN — ATORVASTATIN CALCIUM 20 MILLIGRAM(S): 10 TABLET, FILM COATED ORAL at 04:05

## 2024-10-11 NOTE — ED CDU PROVIDER DISPOSITION NOTE - CLINICAL COURSE
64 hx HLD, frequent unilateral headaches, p/w R sided black spots in peripheral visual field started 8pm, prior to that had unilateral stabbing headache, did not take any meds. Denies weakness, numbness, tingling.   ED Course:  Stroke code called on arrival and continued in light of concern for potential ophthalmic stroke. CT/CTAs performed and non-actionable. Neuro evaluated with no further intervention/recommendations. Ophthalmology evaluated patient and found posterior vitreous detachment with ?retinal involvement. patient offered to f/u with retinal specialist in morning vs. await further eval in ED. Patient opted for CDU for re-examination by ophthalmology attending in AM.  CDU Course: 64 hx HLD, frequent unilateral headaches, p/w R sided black spots in peripheral visual field started 8pm, prior to that had unilateral stabbing headache, did not take any meds. Denies weakness, numbness, tingling.   ED Course:  Stroke code called on arrival and continued in light of concern for potential ophthalmic stroke. CT/CTAs performed and non-actionable. Neuro evaluated with no further intervention/recommendations. Ophthalmology evaluated patient and found posterior vitreous detachment with ?retinal involvement. patient offered to f/u with retinal specialist in morning vs. await further eval in ED. Patient opted for CDU for re-examination by ophthalmology attending in AM.  CDU Course:  Pt did well in CDU had persistent right eye peripheral field floaters, no vision loss or new changes while in CDU. VSS. Spoke with ophthalmology team, overall impression is VD and wound offer if pt wants to wait for the afternoon for team to reassess we can or can offer DC to see in clinic today/tomorrow. Pt would rather go home. Pt also seen by neuro Dr Morales - suspects peripheral over central cause, however can followup outpatient and if symptoms persist may need outpatient MRI. Will dc with follow up. Discussed plan and return precautions with patient who understands and agrees. All questions answered.

## 2024-10-11 NOTE — ED CDU PROVIDER DISPOSITION NOTE - ATTENDING APP SHARED VISIT CONTRIBUTION OF CARE
Dr. Denis: I performed a face to face bedside interview with patient regarding history of present illness, review of symptoms and past medical history. I completed an independent physical exam.  I have discussed patient's plan of care with RASHAAD.   I agree with note as stated above, having amended the EMR as needed to reflect my findings.   This includes HISTORY OF PRESENT ILLNESS, HIV, PAST MEDICAL/SURGICAL/FAMILY/SOCIAL HISTORY, ALLERGIES AND HOME MEDICATIONS, REVIEW OF SYSTEMS, PHYSICAL EXAM, and any PROGRESS NOTES during the time I functioned as the attending physician for this patient.    Dr. Denis: 64-year-old female history of chronic right-sided headaches, presented with right-sided headache yesterday, and floaters in the right eye.  A code stroke was called and CTA was negative.  Neurology signed off.  Concern for vitreous detachment.  Seen by ophthalmology and placed in the CDU for reevaluation.  Patient states she feels well but still has persistent loss of vision in the right lateral visual field of the right eye.  Headache has improved    Gen: No acute distress  HEENT: Mucous membranes moist, pink conjunctivae, EOMI, visual field cut in the right lateral eye temporal area.  CV: RRR, no clubbing/cyanosis/edema  Resp: CTAB  GI: Abdomen soft, NT, ND. Normal BS. No rebound, no guarding  : No CVAT  Neuro: A&O x 3, moving all 4 extremities  MSK: No spine or joint tenderness to palpation  Skin: No rashes    Patient presenting with vitreous detachment.  Discussed with ophthalmology, as per ophthalmology resident patient can be discharged home today with outpatient evaluation by ophthalmology attending.  If patient wishes to wait for ophthalmology attending to see her we will have to wait till 3 PM.  Will discuss with patient when she prefers - pt prefers to DC home.  D/w Neuro attending Dr. Ayoub, recommending checking ESR.

## 2024-10-11 NOTE — ED ADULT NURSE REASSESSMENT NOTE - NSFALLUNIVINTERV_ED_ALL_ED
Bed/Stretcher in lowest position, wheels locked, appropriate side rails in place/Call bell, personal items and telephone in reach/Instruct patient to call for assistance before getting out of bed/chair/stretcher/Non-slip footwear applied when patient is off stretcher/[place] to call system/Physically safe environment - no spills, clutter or unnecessary equipment/Purposeful proactive rounding/Room/bathroom lighting operational, light cord in reach

## 2024-10-11 NOTE — ED CDU PROVIDER DISPOSITION NOTE - PATIENT PORTAL LINK FT
You can access the FollowMyHealth Patient Portal offered by [facility] by registering at the following website: [email or web address]. By joining [employer]’s FollowMyHealth portal, you will also be able to view your health information using other applications (apps) compatible with our system.

## 2024-10-11 NOTE — ED CDU PROVIDER INITIAL DAY NOTE - CLINICAL SUMMARY MEDICAL DECISION MAKING FREE TEXT BOX
64F hx HLD presenting with R eye floater starting at 8pm after headache. No other neuro deficits. HD stable. CT/CTAs negative and cleared by neuro. Ophtho evaluated and found to have posterior vitreous detachment with ?suspected temporal white without pressure OD.   Plan: Re-dilate eyes in AM and evaluation by ophthalmology attending   - F/u ophtho recs

## 2024-10-11 NOTE — ED CDU PROVIDER INITIAL DAY NOTE - PROGRESS NOTE DETAILS
CDU PROGRESS NOTE STEFANIE DORAN: On reassessment patient resting comfortably no acute complaints at this time, still reporting R eye peripheral floaters/dark spots. NAD. VSS. Per signout patient is waiting for ophthalmology attending for dilated retinal exam. CDU PROGRESS NOTE STEFANIE DORAN: Spoke with ophthalmology team, overall impression is VD and wound offer if pt wants to wait for the afternoon for team to reassess we can or can offer DC to see in clinic today/tomorrow. Pt would rather go home. Pt also seen by neuro Dr Morales - suspects peripheral over central cause, however can followup outpatient and if symptoms persist may need outpatient MRI. Will dc with follow up. Discussed plan and return precautions with patient who understands and agrees. All questions answered.

## 2024-10-11 NOTE — ED CDU PROVIDER INITIAL DAY NOTE - ATTENDING CONTRIBUTION TO CARE
I, Delmer San, performed a history and physical exam of the patient and discussed their management with the resident provider. I reviewed the resident provider's note and agree with the documented findings and plan of care. I was present and available for all procedures.    64 hx HLD, frequent unilateral headaches, p/w R sided black spots in peripheral visual field started 8pm, prior to that had unilateral stabbing headache, did not take any meds. Denies weakness, numbness, tingling.     ED Course:  Stroke code called on arrival and continued in light of concern for potential ophthalmic stroke. CT/CTAs performed and non-actionable. Neuro evaluated with no further intervention/recommendations. Ophthalmology evaluated patient and found posterior vitreous detachment with ?retinal involvement. patient offered to f/u with retinal specialist in morning vs. await further eval in ED. Patient opted for CDU for re-examination by ophthalmology attending in AM.

## 2024-10-11 NOTE — CONSULT NOTE ADULT - ASSESSMENT
Assessment and Recommendations:  64y female with a past medical history/ocular history of *** consulted for ***, found to have ***    Seen and discussed with ***.    Outpatient Follow-up: Patient should follow-up with his/her ophthalmologist or with [facility] Department of Ophthalmology within 1 week of after discharge at:    [address]  [place] [address]  [phone]    Reina Reyes MD, PGY-2  Contact: [employer] Teams     Assessment and Recommendations:  64y female with a past medical history/ocular history of Migraine, HTN, epiretinal membrane dry eye syndrome consulted for first time floater OD, found to have Posterior Vitreous Detachment     #PVD OD  - Va 20/20 OU, IOP WNL, PERRLA, CVF full, EOM full painless, color plates full   - anterior exam unremarkable. Merle negative OD   - DFE with ***   - patient educated on posterior vitreous detachment   - RD precautions given   - Patient to follow up with her outpatient ophthalmologist Dr. Guy within 1 week of discharge     #ERM OD   - per patient hx, unable to visualize.   - amsler given to patient previously, she demonstrates understanding of how to use   - patient will follow up with outpatient ophthalmologist for further monitoring of ERM, unrelated to PVD diagnosis above.       Outpatient Follow-up: Patient should follow-up with his/her ophthalmologist or with [facility] Department of Ophthalmology within 1 week of after discharge at:    [address]  [place] [address]  [phone]    Reina Reyes MD, PGY-2  Contact: [employer] Teams     Assessment and Recommendations:  64y female with a past medical history/ocular history of Migraine, HTN, epiretinal membrane dry eye syndrome consulted for first time floater OD, found to have Posterior Vitreous Detachment with area of suspected temporal white without pressure OD    #PVD OD  - Va 20/20 OU, IOP WNL, PERRLA, CVF full, EOM full painless, color plates full   - anterior exam unremarkable. Merle negative OD   - DFE with PVD OD and suspected temporal white without pressure (see below)   - patient educated on posterior vitreous detachment   - RD precautions given   - Patient to follow up with her outpatient ophthalmologist Dr. Guy within 1 week of discharge     #Suspected Temporal white without pressure, far periphery OD  - discrete area of suspected white without pressure temporal, semicircle.   - observed with patient laying fully supine and looking towards ear  - unable to visualize full border   - b scan without evidence of detachment   - given non-emergent clinical presentation, patient offered to follow up outpatient with retina specialist in the morning for further assessment vs further evaluation with attending in AM.   - Patient opted to wait for further evaluation in emergency room.   - patient denies curtains, CVF full,     #ERM OD   - per patient hx, unable to visualize.   - amsler given to patient previously, she demonstrates understanding of how to use   - patient will follow up with outpatient ophthalmologist for further monitoring of ERM, unrelated to PVD diagnosis above.     Outpatient Follow-up: Patient should follow-up with his/her ophthalmologist or with [facility] Department of Ophthalmology within 1 week of after discharge at:    [address]  [place] [address]  [phone]    Reina Reyes MD, PGY-2  Contact: [employer] Teams     Assessment and Recommendations:  64y female with a past medical history/ocular history of Migraine, HTN, epiretinal membrane dry eye syndrome consulted for first time floater OD, found to have Posterior Vitreous Detachment with area of suspected temporal white without pressure OD    #PVD OD  - Va 20/20 OU, IOP WNL, PERRLA, CVF full, EOM full painless, color plates full   - anterior exam unremarkable. Merle negative OD   - DFE with PVD OD and suspected temporal white without pressure (see below)   - patient educated on posterior vitreous detachment   - RD precautions given   - Patient to follow up with her outpatient ophthalmologist Dr. Guy within 1 week of discharge     #Suspected Temporal white without pressure, far periphery OD  - discrete area of suspected white without pressure temporal, semicircle.   - observed with patient laying fully supine and looking towards ear  - unable to visualize full border   - b scan without evidence of detachment   - given non-emergent clinical presentation, patient offered to follow up outpatient with retina specialist in the morning for further assessment vs further evaluation with attending in AM.   - Patient opted to wait for further evaluation in emergency room.   - patient denies curtains, endorses 1 floater and one "[place]" denies "shower" of floaters. Denies flashes. CVF full, macula with ERM OD known at baseline. Area described above is far from the mac.     #ERM OD   - per patient hx, unable to visualize.   - amsler given to patient previously, she demonstrates understanding of how to use   - patient will follow up with outpatient ophthalmologist for further monitoring of ERM, unrelated to PVD diagnosis above.     Outpatient Follow-up: Patient should follow-up with his/her ophthalmologist or with [facility] Department of Ophthalmology within 1 week of after discharge at:    [address]  [place] [address]  [phone]    Reina Reyes MD, PGY-2  Contact: [employer] Teams     Assessment and Recommendations:  64y female with a past medical history/ocular history of Migraine, HTN, epiretinal membrane dry eye syndrome consulted for first time floater OD, found to have Posterior Vitreous Detachment with area of suspected temporal white without pressure OD    #PVD OD  - Va 20/20 OU, IOP WNL, PERRLA, CVF full, EOM full painless, color plates full   - anterior exam unremarkable. Merle negative OD   - DFE with PVD OD and suspected temporal white without pressure (see below)   - patient educated on posterior vitreous detachment   - RD precautions given   - Patient to follow up with her outpatient ophthalmologist Dr. Guy within 1 week of discharge     #Suspected Temporal white without pressure, far periphery OD  - discrete area of suspected white without pressure temporal, semicircle.   - observed with patient laying fully supine and looking towards ear  - unable to visualize full border   - b scan without evidence of detachment   - given non-emergent clinical presentation, patient offered to follow up outpatient with retina specialist in the morning for further assessment vs further evaluation with attending in AM.   - Patient opted to wait for further evaluation in emergency room. ED to re-dilate patient prior to evaluation.   - patient denies curtains, endorses 1 floater and one "[place]" denies "shower" of floaters. Denies flashes. CVF full, macula with ERM OD known at baseline. Area described above is far from the mac.     #ERM OD   - per patient hx, unable to visualize.   - amsler given to patient previously, she demonstrates understanding of how to use   - patient will follow up with outpatient ophthalmologist for further monitoring of ERM, unrelated to PVD diagnosis above.     Outpatient Follow-up: Patient should follow-up with his/her ophthalmologist or with [facility] Department of Ophthalmology within 1 week of after discharge at:    [address]  [place] [address]  [phone]    Reina Reyes MD, PGY-2  Contact: [employer] Teams

## 2024-10-11 NOTE — ED CDU PROVIDER INITIAL DAY NOTE - PHYSICAL EXAMINATION
GEN: NAD, awake, eyes open spontaneously  EYES: normal conjunctiva, perrl  ENT: NCAT, MMM, Trachea midline  CHEST/LUNGS: Non-tachypneic, CTAB, bilateral breath sounds  CARDIAC: Non-tachycardic, normal perfusion  ABDOMEN: Soft, NTND, No rebound/guarding  MSK: No edema, no gross deformity of extremities  SKIN: No rashes, no petechiae, no vesicles  NEURO: CN grossly intact, normal coordination, no focal motor or sensory deficits. Ambulatory. No aphasia.   PSYCH: Alert, appropriate, cooperative, with capacity and insight

## 2024-10-11 NOTE — ED CDU PROVIDER INITIAL DAY NOTE - OBJECTIVE STATEMENT
64 hx HLD, frequent unilateral headaches, p/w R sided black spots in peripheral visual field started 8pm, prior to that had unilateral stabbing headache, did not take any meds. Denies weakness, numbness, tingling.     ED Course:  Stroke code called on arrival and continued in light of concern for potential ophthalmic stroke. CT/CTAs performed and non-actionable. Neuro evaluated with no further intervention/recommendations. Ophthalmology evaluated patient and found posterior vitreous detachment with ?retinal involvement. patient offered to f/u with retinal specialist in morning vs. await further eval in ED. Patient opted for CDU for re-examination by ophthalmology attending in AM.

## 2024-10-11 NOTE — ED CDU PROVIDER DISPOSITION NOTE - NSFOLLOWUPINSTRUCTIONS_ED_ALL_ED_FT
Posterior vitreous detachment (PVD)  - What it is: The vitreous gel in the middle of the eye separates from the retina  - Symptoms: Floaters, flashes of light, blurred vision, dark cloud or curtain in vision  - Causes: Aging, short-sightedness, eye injury, previous eye surgery  - Treatment: Usually doesn't require treatment, but checkups are recommended within 3 months  - Complications: Retinal tears, retinal detachment, macular holes    PVD is a normal part of aging, and by age 65, almost 75% of people will develop it. Symptoms usually improve over time, and no specific treatment is needed. However, complications can be serious and require urgent treatment.     Some things you can expect with PVD include:   - Floaters: You might notice an increase in specks or shadows of gray or black in your vision. Floaters can take many forms, including dots, circles, lines, clouds, and cobwebs. They can be more obvious in bright light or on a manju day.   - Flashes of light: You might see flashes of light, usually at the side of your vision.   - Blurred vision: You might experience blurred vision.     Follow up with her outpatient ophthalmologist Dr. Guy within 1 week of discharge.   Or with [facility] Department of Ophthalmology:  [address]  [place] [address]  [phone]    Return to the Emergency department if you develop a sudden increase in the number or size of small dark spots or squiggly lines that drift across your vision, flashes of light, dark shadow or curtain-like appearance in the middle or on the sides of your field of vision, blurred vision, or worsening of your side vision or peripheral vision. Posterior vitreous detachment (PVD)  - What it is: The vitreous gel in the middle of the eye separates from the retina  - Symptoms: Floaters, flashes of light, blurred vision, dark cloud or curtain in vision  - Causes: Aging, short-sightedness, eye injury, previous eye surgery  - Treatment: Usually doesn't require treatment, but checkups are recommended within 3 months  - Complications: Retinal tears, retinal detachment, macular holes    PVD is a normal part of aging, and by age 65, almost 75% of people will develop it. Symptoms usually improve over time, and no specific treatment is needed. However, complications can be serious and require urgent treatment.     Some things you can expect with PVD include:   - Floaters: You might notice an increase in specks or shadows of gray or black in your vision. Floaters can take many forms, including dots, circles, lines, clouds, and cobwebs. They can be more obvious in bright light or on a manju day.   - Flashes of light: You might see flashes of light, usually at the side of your vision.   - Blurred vision: You might experience blurred vision.     Follow up with her outpatient ophthalmologist Dr. Guy within 1 week of discharge.   Or with [facility] Department of Ophthalmology:  [address] [place] [address]. [address]  [place] [address]  [phone]    Follow up with neurology Dr Morales - see contact info:  Marino Morales  Neurology  [address], [address]  [place] [address]  Phone: [phone]    Return to the Emergency department if you develop a sudden increase in the number or size of small dark spots or squiggly lines that drift across your vision, flashes of light, dark shadow or curtain-like appearance in the middle or on the sides of your field of vision, blurred vision, or worsening of your side vision or peripheral vision.

## 2024-10-11 NOTE — ED ADULT NURSE REASSESSMENT NOTE - NS ED NURSE REASSESS COMMENT FT1
Pt received from ELENA Vaughn. Pt A&O X4, speaks Uz[place], pt able to communicate her needs in English. oriented Pt to CDU & plan of care discussed. Pt is observed in CDU for vision changes, pending opthalmology attending eval. Pt denies any chest pain, SOB, dizziness or palpitations. Pt continue to have vision changes. V/S stable, IV in place, patent and free of signs of infiltration. Pt OOB independently. Safety & comfort measures maintained. Call bell in reach. Will continue to monitor.

## 2024-10-11 NOTE — ED CDU PROVIDER INITIAL DAY NOTE - ATTENDING WITH...
Patient missed appt earlier today, but was able to come in with reminder call and is seen in the anticoagulation clinic. Reviewed past INR and dosing with patient. Patient denies missed or extra doses of warfarin or changes in medications or health. Reports Vit K has been consistent. INR within range. Will continue warfarin dose of 5 mg daily. Recheck INR in 6 weeks.  Reminded to call the office with any changes. INR and dosing per protocol Dr ADELFO [facility]. On site provider today is Dr Benavidez.   
Resident

## 2024-10-11 NOTE — CONSULT NOTE ADULT - SUBJECTIVE AND OBJECTIVE BOX
[facility] DEPARTMENT OF OPHTHALMOLOGY - INITIAL ADULT CONSULT  -----------------------------------------------------------------------------  Contact: TEAMS  -----------------------------------------------------------------------------    HPI: 64 hx HTN, frequent unilateral headaches, p/w R sided black spots in peripheral visual field started 8pm, prior to that had unilateral stabbing headache, did not take any meds. denies weakness, numbness, tingling. , vitals WNL. on arrival  b/l 5/5 UE and LE strength, no dysmetria, EOM intact, visual fields intact, no vision loss, likely retinal detachment versus complex migraine, do not think this is LVO. pending CT/CTA, will treat as a migraine.    Attending MD Riggs.  Agree with above.  Pt is a 65 yo fem with pmhx of HTN, frequent unilateral HA's who presents to ED with R eye peripheral visual field spots starting 2000.  Pt endorses unilateral stabbing HA preceding event.  Denies taking meds PTA.  Denies weakness/numbness/tingling elsewhere.   on arrival. Vital signs non-actionable.  Pt well appearing.  Rest of neuro exam non-actionable. Stroke code called on arrival and continued in light of concern for potential ophthalmic stroke.  Vision 20/30 bilaterally with preserved report of spots.    Interval History: ophthalmology consulted for new floater OD    PMH: HTN, frequent unilateral headaches,   POcHx: denies surg/laser  FH: denies glc/amd  Social History: denies etoh/tobacco  Ophthalmic Medications: none  Allergies: NKDA    Review of Systems:  Constitutional: No fever, chills  Eyes: No blurry vision, flashes, floaters, FBS, erythema, discharge, double vision, OU  Neuro: No tremors  Cardiovascular: No chest pain, palpitations  Respiratory: No SOB, no cough  GI: No nausea, vomiting, abdominal pain  : No dysuria  Skin: no rash  Psych: no depression  Endocrine: no polyuria, polydipsia  Heme/lymph: no swelling    VITALS: T(C): 36.3 (10-10-24 @ 22:55)  T(F): 97.3 (10-10-24 @ 22:55), Max: 97.3 (10-10-24 @ 22:55)  HR: 59 (10-10-24 @ 22:55) (59 - 62)  BP: 160/93 (10-10-24 @ 22:55) (160/93 - 171/96)  RR:  (16 - 18)  SpO2:  (98% - 98%)  Wt(kg): --  General: AAO x 3, appropriate mood and affect    Ophthalmology Exam:  Visual acuity (sc): 20/20 OD, 20/20 OS  Pupils: PERRL OU, no RAPD  Ttono: 16 OD 16 OS  Extraocular movements (EOMs): Full OU, no pain, no diplopia  Confrontational Visual Field (CVF): Full OD, Full OS  Color Plates: 12/12 OD, 12/12 OS    Pen Light Exam (PLE)  External: Flat OU  Lids/Lashes/Lacrimal Ducts: Flat OU    Sclera/Conjunctiva: W+Q OU  Cornea: Cl OU  Anterior Chamber: D+F OU    Iris: Flat OU  Lens: Cl OU    Fundus Exam: dilated with 1% tropicamide and 2.5% phenylephrine  Approval obtained from primary team for dilation  Patient aware that pupils can remained dilated for at least 4-6 hours  Exam performed with 20D lens    Vitreous: wnl OU  Disc, cup/disc: sharp and pink, 0.4 OU  Macula: Flat OU  Vessels: Normal caliber OU  Periphery: flat OU    Labs/Imaging:  CTA NECK:  No evidence of significant stenosis or occlusion.    CTA HEAD:  Patent intracranial circulation without flow limiting stenosis.  No evidence of aneurysm. Tiny aneurysms can be beyond the resolution of   CTA technique.    Stroke protocol IMPRESSION:    No acute intracranial hemorrhage, mass effect, or CT evidence of an acute   vascular territorial infarct.    Findings were discussed with Dr. Birmingham by Dr. Keller on 10/10/2024 10:33   PM with readback confirmation. [facility] DEPARTMENT OF OPHTHALMOLOGY - INITIAL ADULT CONSULT  -----------------------------------------------------------------------------  Contact: TEAMS  -----------------------------------------------------------------------------    HPI: 64 hx HTN, frequent unilateral headaches, p/w R sided black spots in peripheral visual field started 8pm, prior to that had unilateral stabbing headache, did not take any meds. denies weakness, numbness, tingling. , vitals WNL. on arrival  b/l 5/5 UE and LE strength, no dysmetria, EOM intact, visual fields intact, no vision loss, likely retinal detachment versus complex migraine, do not think this is LVO. pending CT/CTA, will treat as a migraine.    Attending MD Riggs.  Agree with above.  Pt is a 63 yo fem with pmhx of HTN, frequent unilateral HA's who presents to ED with R eye peripheral visual field spots starting 2000.  Pt endorses unilateral stabbing HA preceding event.  Denies taking meds PTA.  Denies weakness/numbness/tingling elsewhere.   on arrival. Vital signs non-actionable.  Pt well appearing.  Rest of neuro exam non-actionable. Stroke code called on arrival and continued in light of concern for potential ophthalmic stroke.  Vision 20/30 bilaterally with preserved report of spots.    Interval History: ophthalmology consulted for new floater and "[place]"  OD noted earlier today. No pror hx of floaters. Follows with Trina Puckett. Diagnosed with ERM OD. Previously given amsler grid. Endorses dry eye hx, uses PFAT for relief   Denies headache, jaw claudication, scalp tenderness, weight loss, polymyalgia rheumatica.     PMH: HTN, frequent unilateral headaches,   POcHx: denies surg/laser, Hx of ERM OD   FH: denies glc/amd  Social History: denies etoh/tobacco  Ophthalmic Medications: PFAT  Allergies: NKDA    Review of Systems:  Constitutional: No fever, chills  Eyes: No blurry vision, flashes, floaters, FBS, erythema, discharge, double vision, OU  Neuro: No tremors  Cardiovascular: No chest pain, palpitations  Respiratory: No SOB, no cough  GI: No nausea, vomiting, abdominal pain  : No dysuria  Skin: no rash  Psych: no depression  Endocrine: no polyuria, polydipsia  Heme/lymph: no swelling    VITALS: T(C): 36.3 (10-10-24 @ 22:55)  T(F): 97.3 (10-10-24 @ 22:55), Max: 97.3 (10-10-24 @ 22:55)  HR: 59 (10-10-24 @ 22:55) (59 - 62)  BP: 160/93 (10-10-24 @ 22:55) (160/93 - 171/96)  RR:  (16 - 18)  SpO2:  (98% - 98%)  Wt(kg): --  General: AAO x 3, appropriate mood and affect    Ophthalmology Exam:  Visual acuity (cc- reading glasses): 20/20 OD, 20/20 OS  Pupils: PERRL OU, no RAPD  Ttono: 10 OD 12 OS  Extraocular movements (EOMs): Full OU, no pain, no diplopia  Confrontational Visual Field (CVF): Full OD, Full OS  Color Plates: 12/12 OD, 12/12 OS    Slit lamp Exam  External: Flat OU  Lids/Lashes/Lacrimal Ducts: Flat OU    Sclera/Conjunctiva: W+Q OU  Cornea: Cl OU  Anterior Chamber: D+F OU  Lon negative OD   Iris: Flat OU  Lens: Cl OU    Fundus Exam: dilated with 1% tropicamide and 2.5% phenylephrine  Approval obtained from primary team for dilation  Patient aware that pupils can remained dilated for at least 4-6 hours  Exam performed with 20D lens    Vitreous: wnl OU  Disc, cup/disc: sharp and pink, 0.4 OU  Macula: Flat OU  Vessels: Normal caliber OU  Periphery: flat OU    Labs/Imaging:  CTA NECK:  No evidence of significant stenosis or occlusion.    CTA HEAD:  Patent intracranial circulation without flow limiting stenosis.  No evidence of aneurysm. Tiny aneurysms can be beyond the resolution of   CTA technique.    Stroke protocol IMPRESSION:    No acute intracranial hemorrhage, mass effect, or CT evidence of an acute   vascular territorial infarct.    Findings were discussed with Dr. Birmingham by Dr. Keller on 10/10/2024 10:33   PM with readback confirmation. [facility] DEPARTMENT OF OPHTHALMOLOGY - INITIAL ADULT CONSULT  -----------------------------------------------------------------------------  Contact: TEAMS  -----------------------------------------------------------------------------    HPI: 64 hx HTN, frequent unilateral headaches, p/w R sided black spots in peripheral visual field started 8pm, prior to that had unilateral stabbing headache, did not take any meds. denies weakness, numbness, tingling. , vitals WNL. on arrival  b/l 5/5 UE and LE strength, no dysmetria, EOM intact, visual fields intact, no vision loss, likely retinal detachment versus complex migraine, do not think this is LVO. pending CT/CTA, will treat as a migraine.    Attending MD Riggs.  Agree with above.  Pt is a 63 yo fem with pmhx of HTN, frequent unilateral HA's who presents to ED with R eye peripheral visual field spots starting 2000.  Pt endorses unilateral stabbing HA preceding event.  Denies taking meds PTA.  Denies weakness/numbness/tingling elsewhere.   on arrival. Vital signs non-actionable.  Pt well appearing.  Rest of neuro exam non-actionable. Stroke code called on arrival and continued in light of concern for potential ophthalmic stroke.  Vision 20/30 bilaterally with preserved report of spots.    Interval History: ophthalmology consulted for new floater and "[place]"  OD noted earlier today. No pror hx of floaters. Follows with Trina Puckett. Diagnosed with ERM OD. Previously given amsler grid. Endorses dry eye hx, uses PFAT for relief   Denies headache, jaw claudication, scalp tenderness, weight loss, polymyalgia rheumatica.     PMH: HTN, frequent unilateral headaches,   POcHx: denies surg/laser, Hx of ERM OD   FH: denies glc/amd  Social History: denies etoh/tobacco  Ophthalmic Medications: PFAT  Allergies: NKDA    Review of Systems:  Constitutional: No fever, chills  Eyes: No blurry vision, flashes, floaters, FBS, erythema, discharge, double vision, OU  Neuro: No tremors  Cardiovascular: No chest pain, palpitations  Respiratory: No SOB, no cough  GI: No nausea, vomiting, abdominal pain  : No dysuria  Skin: no rash  Psych: no depression  Endocrine: no polyuria, polydipsia  Heme/lymph: no swelling    VITALS: T(C): 36.3 (10-10-24 @ 22:55)  T(F): 97.3 (10-10-24 @ 22:55), Max: 97.3 (10-10-24 @ 22:55)  HR: 59 (10-10-24 @ 22:55) (59 - 62)  BP: 160/93 (10-10-24 @ 22:55) (160/93 - 171/96)  RR:  (16 - 18)  SpO2:  (98% - 98%)  Wt(kg): --  General: AAO x 3, appropriate mood and affect    Ophthalmology Exam:  Visual acuity (cc- reading glasses): 20/20 OD, 20/20 OS  Pupils: PERRL OU, no RAPD  Ttono: 10 OD 12 OS  Extraocular movements (EOMs): Full OU, no pain, no diplopia  Confrontational Visual Field (CVF): Full OD, Full OS  Color Plates: 12/12 OD, 12/12 OS    Slit lamp Exam  External: Flat OU  Lids/Lashes/Lacrimal Ducts: Flat OU    Sclera/Conjunctiva: W+Q OU  Cornea: Cl OU  Anterior Chamber: D+F OU  Lon negative OD   Iris: Flat OU  Lens: Cl OU    Fundus Exam: dilated with 1% tropicamide and 2.5% phenylephrine  Approval obtained from primary team for dilation  Patient aware that pupils can remained dilated for at least 4-6 hours  Exam performed with 20D lens    Vitreous: PVD w/ huber ring OD WNL OS   Disc, cup/disc: sharp and pink, 0.4 OU  Macula: Flat OU  Vessels: Normal caliber OU  Periphery: discrete temporal area of retinal pigment change, unable to assess full border appears attached, differential diagnosis of white without pressure OD flat OS    B scan: no detachment appreciated on B scan     Labs/Imaging:  CTA NECK:  No evidence of significant stenosis or occlusion.    CTA HEAD:  Patent intracranial circulation without flow limiting stenosis.  No evidence of aneurysm. Tiny aneurysms can be beyond the resolution of   CTA technique.    Stroke protocol IMPRESSION:    No acute intracranial hemorrhage, mass effect, or CT evidence of an acute   vascular territorial infarct.    Findings were discussed with Dr. Birmingham by Dr. Keller on 10/10/2024 10:33   PM with readback confirmation. [facility] DEPARTMENT OF OPHTHALMOLOGY - INITIAL ADULT CONSULT  -----------------------------------------------------------------------------  Contact: TEAMS  -----------------------------------------------------------------------------    HPI: 64 hx HTN, frequent unilateral headaches, p/w R sided black spots in peripheral visual field started 8pm, prior to that had unilateral stabbing headache, did not take any meds. denies weakness, numbness, tingling. , vitals WNL. on arrival  b/l 5/5 UE and LE strength, no dysmetria, EOM intact, visual fields intact, no vision loss, likely retinal detachment versus complex migraine, do not think this is LVO. pending CT/CTA, will treat as a migraine.    Attending MD Riggs.  Agree with above.  Pt is a 63 yo fem with pmhx of HTN, frequent unilateral HA's who presents to ED with R eye peripheral visual field spots starting 2000.  Pt endorses unilateral stabbing HA preceding event.  Denies taking meds PTA.  Denies weakness/numbness/tingling elsewhere.   on arrival. Vital signs non-actionable.  Pt well appearing.  Rest of neuro exam non-actionable. Stroke code called on arrival and continued in light of concern for potential ophthalmic stroke.  Vision 20/30 bilaterally with preserved report of spots.    Interval History: ophthalmology consulted for new floater and "[place]"  OD noted earlier today. No pror hx of floaters. Follows with Trina Puckett. Diagnosed with ERM OD. Previously given amsler grid. Endorses dry eye hx, uses PFAT for relief   Denies jaw claudication, scalp tenderness, weight loss, polymyalgia rheumatica.   denies curtains, denies "shower" of floaters. Denies flashes.       PMH: HTN, frequent unilateral headaches,   POcHx: denies surg/laser, Hx of ERM OD   FH: denies glc/amd  Social History: denies etoh/tobacco  Ophthalmic Medications: PFAT  Allergies: NKDA    Review of Systems:  Constitutional: No fever, chills  Eyes: No blurry vision, flashes, floaters, FBS, erythema, discharge, double vision, OU  Neuro: No tremors  Cardiovascular: No chest pain, palpitations  Respiratory: No SOB, no cough  GI: No nausea, vomiting, abdominal pain  : No dysuria  Skin: no rash  Psych: no depression  Endocrine: no polyuria, polydipsia  Heme/lymph: no swelling    VITALS: T(C): 36.3 (10-10-24 @ 22:55)  T(F): 97.3 (10-10-24 @ 22:55), Max: 97.3 (10-10-24 @ 22:55)  HR: 59 (10-10-24 @ 22:55) (59 - 62)  BP: 160/93 (10-10-24 @ 22:55) (160/93 - 171/96)  RR:  (16 - 18)  SpO2:  (98% - 98%)  Wt(kg): --  General: AAO x 3, appropriate mood and affect    Ophthalmology Exam:  Visual acuity (cc- reading glasses): 20/20 OD, 20/20 OS  Pupils: PERRL OU, no RAPD  Ttono: 10 OD 12 OS  Extraocular movements (EOMs): Full OU, no pain, no diplopia  Confrontational Visual Field (CVF): Full OD, Full OS  Color Plates: 12/12 OD, 12/12 OS    Slit lamp Exam  External: Flat OU  Lids/Lashes/Lacrimal Ducts: Flat OU    Sclera/Conjunctiva: W+Q OU  Cornea: Cl OU  Anterior Chamber: D+F OU  Lon negative OD   Iris: Flat OU  Lens: Cl OU    Fundus Exam: dilated with 1% tropicamide and 2.5% phenylephrine  Approval obtained from primary team for dilation  Patient aware that pupils can remained dilated for at least 4-6 hours  Exam performed with 20D lens    Vitreous: PVD w/ huber ring OD WNL OS   Disc, cup/disc: sharp and pink, 0.3 OU  Macula: Flat OU  Vessels: Normal caliber OU  Periphery: discrete temporal area of retinal pigment change, unable to assess full border appears attached, differential diagnosis of white without pressure OD exudates near superior arcade OS    B scan: no detachment appreciated on B scan     Labs/Imaging:  CTA NECK:  No evidence of significant stenosis or occlusion.    CTA HEAD:  Patent intracranial circulation without flow limiting stenosis.  No evidence of aneurysm. Tiny aneurysms can be beyond the resolution of   CTA technique.    Stroke protocol IMPRESSION:    No acute intracranial hemorrhage, mass effect, or CT evidence of an acute   vascular territorial infarct.    Findings were discussed with Dr. Birmingham by Dr. Keller on 10/10/2024 10:33   PM with readback confirmation.